# Patient Record
Sex: FEMALE | Race: OTHER
[De-identification: names, ages, dates, MRNs, and addresses within clinical notes are randomized per-mention and may not be internally consistent; named-entity substitution may affect disease eponyms.]

---

## 2020-03-01 ENCOUNTER — HOSPITAL ENCOUNTER (EMERGENCY)
Dept: HOSPITAL 72 - EMR | Age: 45
Discharge: HOME | End: 2020-03-01
Payer: COMMERCIAL

## 2020-03-01 VITALS — DIASTOLIC BLOOD PRESSURE: 90 MMHG | SYSTOLIC BLOOD PRESSURE: 159 MMHG

## 2020-03-01 VITALS — DIASTOLIC BLOOD PRESSURE: 88 MMHG | SYSTOLIC BLOOD PRESSURE: 150 MMHG

## 2020-03-01 VITALS — WEIGHT: 145 LBS | HEIGHT: 61 IN | BODY MASS INDEX: 27.38 KG/M2
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
## 2020-03-01 DIAGNOSIS — S61.431A: Primary | ICD-10-CM

## 2020-03-01 LAB
ADD MANUAL DIFF: NO
ALBUMIN SERPL-MCNC: 3.9 G/DL (ref 3.4–5)
ALBUMIN/GLOB SERPL: 0.9 {RATIO} (ref 1–2.7)
ALP SERPL-CCNC: 85 U/L (ref 46–116)
ALT SERPL-CCNC: 24 U/L (ref 12–78)
ANION GAP SERPL CALC-SCNC: 4 MMOL/L (ref 5–15)
AST SERPL-CCNC: 16 U/L (ref 15–37)
BASOPHILS NFR BLD AUTO: 0.9 % (ref 0–2)
BILIRUB SERPL-MCNC: 0.2 MG/DL (ref 0.2–1)
BUN SERPL-MCNC: 12 MG/DL (ref 7–18)
CALCIUM SERPL-MCNC: 9.4 MG/DL (ref 8.5–10.1)
CHLORIDE SERPL-SCNC: 99 MMOL/L (ref 98–107)
CO2 SERPL-SCNC: 25 MMOL/L (ref 21–32)
CREAT SERPL-MCNC: 0.8 MG/DL (ref 0.55–1.3)
EOSINOPHIL NFR BLD AUTO: 3.2 % (ref 0–3)
ERYTHROCYTE [DISTWIDTH] IN BLOOD BY AUTOMATED COUNT: 13.7 % (ref 11.6–14.8)
GLOBULIN SER-MCNC: 4.3 G/DL
HCT VFR BLD CALC: 39.8 % (ref 37–47)
HGB BLD-MCNC: 12.8 G/DL (ref 12–16)
LYMPHOCYTES NFR BLD AUTO: 27.3 % (ref 20–45)
MCV RBC AUTO: 79 FL (ref 80–99)
MONOCYTES NFR BLD AUTO: 6.2 % (ref 1–10)
NEUTROPHILS NFR BLD AUTO: 62.4 % (ref 45–75)
PLATELET # BLD: 428 K/UL (ref 150–450)
POTASSIUM SERPL-SCNC: 3.7 MMOL/L (ref 3.5–5.1)
RBC # BLD AUTO: 5.05 M/UL (ref 4.2–5.4)
SODIUM SERPL-SCNC: 128 MMOL/L (ref 136–145)
WBC # BLD AUTO: 8.9 K/UL (ref 4.8–10.8)

## 2020-03-01 PROCEDURE — 36415 COLL VENOUS BLD VENIPUNCTURE: CPT

## 2020-03-01 PROCEDURE — 85025 COMPLETE CBC W/AUTO DIFF WBC: CPT

## 2020-03-01 PROCEDURE — 86703 HIV-1/HIV-2 1 RESULT ANTBDY: CPT

## 2020-03-01 PROCEDURE — 86803 HEPATITIS C AB TEST: CPT

## 2020-03-01 PROCEDURE — 87517 HEPATITIS B DNA QUANT: CPT

## 2020-03-01 PROCEDURE — 81025 URINE PREGNANCY TEST: CPT

## 2020-03-01 PROCEDURE — 99283 EMERGENCY DEPT VISIT LOW MDM: CPT

## 2020-03-01 PROCEDURE — 80053 COMPREHEN METABOLIC PANEL: CPT

## 2020-03-01 NOTE — EMERGENCY ROOM REPORT
History of Present Illness


General


Chief Complaint:  Upper Extremity Injury


Source:  Patient





Present Illness


HPI


44-year-old female presents to the emergency department complaining of 

accidental needlestick while at work earlier today.  Patient reports she was 

picking up a dish and she felt a poke on her right fifth digit.  Patient states 

she began bleeding.  Patient states that she found a small sharp object 

underneath the plate which poked her and it was a lancet.  Patient denies 

previous significant past medical history she denies history of hepatitis B, C 

or HIV.  Patient denies history of immune compromise.  She denies suspicion of 

pregnancy.  She reports bleeding has subsided at this time.  Denies any other 

aggravating or relieving factors. Tetanus is not UTD.


Allergies:  


Coded Allergies:  


     No Known Allergies (Unverified , 3/1/20)





Patient History


Past Medical History:  see triage record


Past Surgical History:  none


Pertinent Family History:  none


Last Menstrual Period:  2/20/20


Pregnant Now:  No


Reviewed Nursing Documentation:  PMH: Agreed; PSxH: Agreed





Nursing Documentation-PMH


Past Medical History:  No Stated History





Review of Systems


All Other Systems:  negative except mentioned in HPI





Physical Exam





Vital Signs








  Date Time  Temp Pulse Resp B/P (MAP) Pulse Ox O2 Delivery O2 Flow Rate FiO2


 


3/1/20 14:12 97.5 68 17 182/92 (122) 96 Room Air  








Sp02 EP Interpretation:  reviewed, normal


General Appearance:  no apparent distress, alert, GCS 15, non-toxic


Head:  normocephalic, atraumatic


Eyes:  bilateral eye normal inspection, bilateral eye PERRL


ENT:  hearing grossly normal, normal voice


Neck:  full range of motion


Respiratory:  lungs clear, normal breath sounds, speaking full sentences


Cardiovascular #1:  regular rate, rhythm, normal capillary refill


Musculoskeletal:  normal range of motion, gait/station normal, non-tender


Neurologic:  alert, motor strength/tone normal, oriented x3, sensory intact, 

responsive, speech normal


Psychiatric:  judgement/insight normal


Skin:  other - Small pinpoint puncture wound noted to the right 5th digit 

distally.  not bleeding at this time, no surrounding erythema, no evidence of 

secondary infection





Medical Decision Making


PA Attestation


Dr. Ness Is my supervising Physician whom patient management has been 

discussed with.


Diagnostic Impression:  


 Primary Impression:  


 Needlestick injury of finger of right hand


ER Course


44-year-old female presents to the emergency department complaining of 

accidental needlestick while at work earlier today.  Patient reports she was 

picking up a dish and she felt a poke on her right fifth digit.  Patient states 

she began bleeding.  Patient states that she found a small sharp object 

underneath the plate which poked her and it was a lancet.  Patient denies 

previous significant past medical history she denies history of hepatitis B, C 

or HIV.  Patient denies history of immune compromise.  She denies suspicion of 

pregnancy.  She reports bleeding has subsided at this time.  Denies any other 

aggravating or relieving factors. Tetanus is not UTD.





- Pt Reports receiving childhood required vaccinations including Hepatitis 

series.





Ddx considered but are not limited to cellulitis, HIV exposure, hepatitis 

exposure, tetanus





Vital signs: are WNL, pt. is afebrile


H&PE are most consistent with : Needle sick injury, possible exposure.


 --Small pinpoint puncture wound noted to the right 5th digit distally.  not 

bleeding at this time, no surrounding erythema, no evidence of secondary 

infection.





ORDERS: 


-Anti-Hbs-ag: Negative


- liver panel: WNL no acute elevation, or indication of chronic liver disease.


- rapid HIV 1 & 2 : Negative


- Hepatitis C Anti-body: Negative


--Urine Hcg: Negative





PT. EDUCATION: -Discussed with patient that since the incident occurred several 

hours ago testing her right now for contraction of the virus' would most likely 

be negative, and that she needs to followup in a month to be re-tested. She 

will be given copy of her lab results.





ED INTERVENTIONS: 


-None required at this time. 








DISCHARGE: At this time pt. is stable for d/c to home. Will provide printed 

patient care instructions, and any necessary prescriptions. Care plan and 

follow up instructions have been discussed with the patient prior to discharge.





Labs








Test


  3/1/20


14:46


 


White Blood Count


  8.9 K/UL


(4.8-10.8)


 


Red Blood Count


  5.05 M/UL


(4.20-5.40)


 


Hemoglobin


  12.8 G/DL


(12.0-16.0)


 


Hematocrit


  39.8 %


(37.0-47.0)


 


Mean Corpuscular Volume 79 FL (80-99) 


 


Mean Corpuscular Hemoglobin


  25.3 PG


(27.0-31.0)


 


Mean Corpuscular Hemoglobin


Concent 32.1 G/DL


(32.0-36.0)


 


Red Cell Distribution Width


  13.7 %


(11.6-14.8)


 


Platelet Count


  428 K/UL


(150-450)


 


Mean Platelet Volume


  7.2 FL


(6.5-10.1)


 


Neutrophils (%) (Auto)


  62.4 %


(45.0-75.0)


 


Lymphocytes (%) (Auto)


  27.3 %


(20.0-45.0)


 


Monocytes (%) (Auto)


  6.2 %


(1.0-10.0)


 


Eosinophils (%) (Auto)


  3.2 %


(0.0-3.0)


 


Basophils (%) (Auto)


  0.9 %


(0.0-2.0)


 


Urine HCG, Qualitative


  Negative


(NEGATIVE)


 


Sodium Level


  128 MMOL/L


(136-145)


 


Potassium Level


  3.7 MMOL/L


(3.5-5.1)


 


Chloride Level


  99 MMOL/L


()


 


Carbon Dioxide Level


  25 MMOL/L


(21-32)


 


Anion Gap


  4 mmol/L


(5-15)


 


Blood Urea Nitrogen


  12 mg/dL


(7-18)


 


Creatinine


  0.8 MG/DL


(0.55-1.30)


 


Estimat Glomerular Filtration


Rate > 60 mL/min


(>60)


 


Glucose Level


  97 MG/DL


()


 


Calcium Level


  9.4 MG/DL


(8.5-10.1)


 


Total Bilirubin


  0.2 MG/DL


(0.2-1.0)


 


Aspartate Amino Transf


(AST/SGOT) 16 U/L (15-37) 


 


 


Alanine Aminotransferase


(ALT/SGPT) 24 U/L (12-78) 


 


 


Alkaline Phosphatase


  85 U/L


()


 


Total Protein


  8.2 G/DL


(6.4-8.2)


 


Albumin


  3.9 G/DL


(3.4-5.0)


 


Globulin 4.3 g/dL 


 


Albumin/Globulin Ratio 0.9 (1.0-2.7) 


 


HIV (1&2) Antibody Rapid


  Negative


(NEGATIVE)











Last Vital Signs








  Date Time  Temp Pulse Resp B/P (MAP) Pulse Ox O2 Delivery O2 Flow Rate FiO2


 


3/1/20 14:12 97.5 68 17 182/92 (122) 96 Room Air  








Disposition:  HOME, SELF-CARE


Condition:  Stable


Scripts


Bacitracin (Bacitracin) 28.4 Gm Oint...g.


1 APPLIC TOPIC THREE TIMES A DAY, #28.4 GM


   Prov: Dee Frias         3/1/20


Referrals:  


NOT CHOSEN [facility]/MD,REFERRING (PCP)


Patient Instructions:  Needle Stick Injury, Easy-to-Read





Additional Instructions:  


Take medications as directed. 





 ** Follow up with a Primary Care Provider in 3-5 days, even if your symptoms 

have resolved. ** 


--Please review list of primary care clinics, if you do not already have a 

primary care provider





Return sooner to ED if new symptoms occur, or current symptoms become worse. 











- Please note that this Emergency Department Report was dictated using [employer] technology software, occasionally this can lead to 

erroneous entry secondary to interpretation by the dictation equipment.











Dee Frias Mar 1, 2020 14:55

## 2020-03-01 NOTE — NUR
ED Nurse Note:

Pt walked into ED due to finger poke at work at the [facility]. Pt 
accidentally poked R 5th digit on hand on a needle at work. Pt finger is CDI, 
but was bleeding previously. Pt denies pain, numbness, tingling. Pt is alert 
and orientedx4, ambulatory.